# Patient Record
Sex: FEMALE | Race: WHITE | NOT HISPANIC OR LATINO | Employment: OTHER | URBAN - METROPOLITAN AREA
[De-identification: names, ages, dates, MRNs, and addresses within clinical notes are randomized per-mention and may not be internally consistent; named-entity substitution may affect disease eponyms.]

---

## 2023-10-09 ENCOUNTER — TELEPHONE (OUTPATIENT)
Dept: NEUROLOGY | Facility: CLINIC | Age: 88
End: 2023-10-09
Payer: MEDICARE

## 2023-10-09 NOTE — TELEPHONE ENCOUNTER
Called and wanted a call from Dr Sinha before her 10/26 appointment.  States that the Botox is not working at all.

## 2023-10-19 ENCOUNTER — TELEPHONE (OUTPATIENT)
Dept: NEUROLOGY | Facility: CLINIC | Age: 88
End: 2023-10-19
Payer: MEDICARE

## 2023-10-19 NOTE — TELEPHONE ENCOUNTER
Irwin called 10/9 to let us know that the Botox is not working.  She called again today wondering if the dose will be increased or stopped.  Please advise me as to what I can tell her to expect at her next appointment.

## 2023-10-26 RX ORDER — CRANBERRY FRUIT 450 MG
450 TABLET ORAL
COMMUNITY
Start: 2019-01-23

## 2023-10-26 RX ORDER — CYPROHEPTADINE HYDROCHLORIDE 4 MG/1
1 TABLET ORAL NIGHTLY
COMMUNITY
Start: 2019-01-23 | End: 2024-05-13 | Stop reason: ALTCHOICE

## 2023-10-26 RX ORDER — ACETAMINOPHEN 500 MG
1 TABLET ORAL
COMMUNITY
Start: 2019-01-23

## 2023-10-26 RX ORDER — PANTOPRAZOLE SODIUM 40 MG/1
1 TABLET, DELAYED RELEASE ORAL DAILY
COMMUNITY
Start: 2019-01-23

## 2023-10-26 RX ORDER — PROGESTERONE 200 MG/1
200 CAPSULE ORAL
COMMUNITY
Start: 2019-01-23

## 2023-10-26 RX ORDER — FLUOXETINE HYDROCHLORIDE 40 MG/1
40 CAPSULE ORAL
COMMUNITY
Start: 2023-10-16

## 2023-10-26 RX ORDER — PROPRANOLOL/HYDROCHLOROTHIAZID 40 MG-25MG
1 TABLET ORAL DAILY
COMMUNITY
Start: 2019-01-23 | End: 2024-03-13 | Stop reason: ALTCHOICE

## 2023-10-26 RX ORDER — DICYCLOMINE HYDROCHLORIDE 10 MG/1
1 CAPSULE ORAL 3 TIMES DAILY
COMMUNITY
Start: 2019-01-23 | End: 2024-03-13 | Stop reason: ALTCHOICE

## 2023-10-26 RX ORDER — FLUTICASONE PROPIONATE 50 MCG
2 SPRAY, SUSPENSION (ML) NASAL DAILY
COMMUNITY
Start: 2019-01-23 | End: 2024-03-13 | Stop reason: ALTCHOICE

## 2023-10-26 RX ORDER — LINACLOTIDE 72 UG/1
1 CAPSULE, GELATIN COATED ORAL DAILY
COMMUNITY
Start: 2019-01-23 | End: 2024-05-13 | Stop reason: ALTCHOICE

## 2023-10-26 RX ORDER — IBUPROFEN 600 MG/1
1 TABLET ORAL 3 TIMES DAILY PRN
COMMUNITY
Start: 2019-01-23 | End: 2024-05-13 | Stop reason: ALTCHOICE

## 2023-10-26 RX ORDER — DULOXETIN HYDROCHLORIDE 20 MG/1
1 CAPSULE, DELAYED RELEASE ORAL DAILY
COMMUNITY
Start: 2019-01-23 | End: 2024-03-13 | Stop reason: ALTCHOICE

## 2023-10-26 RX ORDER — SIMVASTATIN 20 MG/1
1 TABLET, FILM COATED ORAL DAILY
COMMUNITY
Start: 2019-01-23

## 2023-10-26 RX ORDER — AMLODIPINE BESYLATE 5 MG/1
1 TABLET ORAL DAILY
COMMUNITY
Start: 2019-01-23

## 2023-10-26 RX ORDER — NITROGLYCERIN 0.4 MG/1
0.4 TABLET SUBLINGUAL EVERY 5 MIN PRN
COMMUNITY
Start: 2019-01-23

## 2023-10-26 RX ORDER — FLUOROURACIL 5 MG/G
1 CREAM TOPICAL SEE ADMIN INSTRUCTIONS
COMMUNITY
Start: 2019-01-23

## 2023-10-26 RX ORDER — ASPIRIN 81 MG/1
1 TABLET ORAL DAILY
COMMUNITY
Start: 2019-01-23

## 2023-10-27 ENCOUNTER — PROCEDURE VISIT (OUTPATIENT)
Dept: NEUROLOGY | Facility: HOSPITAL | Age: 88
End: 2023-10-27
Payer: MEDICARE

## 2023-10-27 VITALS
WEIGHT: 175 LBS | TEMPERATURE: 97.1 F | HEIGHT: 61 IN | BODY MASS INDEX: 33.04 KG/M2 | RESPIRATION RATE: 18 BRPM | SYSTOLIC BLOOD PRESSURE: 149 MMHG | DIASTOLIC BLOOD PRESSURE: 76 MMHG | HEART RATE: 73 BPM

## 2023-10-27 DIAGNOSIS — G43.711 CHRONIC MIGRAINE WITHOUT AURA, INTRACTABLE, WITH STATUS MIGRAINOSUS: Primary | ICD-10-CM

## 2023-10-27 PROCEDURE — 64615 CHEMODENERV MUSC MIGRAINE: CPT | Performed by: PSYCHIATRY & NEUROLOGY

## 2023-10-27 PROCEDURE — 2500000004 HC RX 250 GENERAL PHARMACY W/ HCPCS (ALT 636 FOR OP/ED): Mod: JZ,JG | Performed by: PSYCHIATRY & NEUROLOGY

## 2023-10-27 RX ADMIN — ONABOTULINUMTOXINA 200 UNITS: 100 INJECTION, POWDER, LYOPHILIZED, FOR SOLUTION INTRADERMAL; INTRAMUSCULAR at 14:02

## 2023-10-27 ASSESSMENT — ENCOUNTER SYMPTOMS
OCCASIONAL FEELINGS OF UNSTEADINESS: 1
DEPRESSION: 0
LOSS OF SENSATION IN FEET: 0

## 2023-10-27 ASSESSMENT — PAIN SCALES - GENERAL: PAINLEVEL: 0-NO PAIN

## 2023-10-27 NOTE — PROGRESS NOTES
Patient ID: Irwin Napoles is a 88 y.o. female.    Head/Face/Jaw Botulinum Injection    Date/Time: 10/27/2023 3:05 PM    Performed by: Lashell Sinha MD  Authorized by: Lashell Sinha MD      Consent:      Consent obtained:  Verbal     Consent given by:  Patient    Procedure details:      EMG used?  No     Electrical stimulation used?  No     Diluted by:  Preservative free saline     Toxin (Brand):  OnaBoNT-A (Botox)     Total units available:  200       Ad hoc region injected:  Head see diagram with 200 units     Total units injected:  200     Total units wasted:  0    Post-procedure details:      Patient tolerance of procedure:  Tolerated well, no immediate complications    Comments: Please do not rub areas for 24 hours.  No pressure above eyebrows for 24 hours.  Watch out for helmets, headlamps, headbands, goggles, or massage for 24 hours.  If there is discomfort, ice for the first 24 hour,s heat after that.  Headaches may worsen, or you may experience neck stiffness.  If this occurs use your usual headache medication or a mild anti inflammatory such as advil or aleve.  Please call if you have difficulty swallowing.      Don't get discouraged by the lack of effect from the first botox we know it is only 75% as effective as the second.   We will do an appointment in 6 weeks. And repeat in 3 months. Patient is here for botox injection.

## 2024-01-22 ENCOUNTER — TELEPHONE (OUTPATIENT)
Dept: NEUROLOGY | Facility: CLINIC | Age: 89
End: 2024-01-22
Payer: MEDICARE

## 2024-01-26 ENCOUNTER — APPOINTMENT (OUTPATIENT)
Dept: NEUROLOGY | Facility: HOSPITAL | Age: 89
End: 2024-01-26

## 2024-02-02 ENCOUNTER — TELEPHONE (OUTPATIENT)
Dept: NEUROLOGY | Facility: CLINIC | Age: 89
End: 2024-02-02
Payer: MEDICARE

## 2024-02-02 DIAGNOSIS — G43.711 CHRONIC MIGRAINE WITHOUT AURA, INTRACTABLE, WITH STATUS MIGRAINOSUS: ICD-10-CM

## 2024-02-02 RX ORDER — BUTALBITAL, ASPIRIN, AND CAFFEINE 325; 50; 40 MG/1; MG/1; MG/1
CAPSULE ORAL
Qty: 30 CAPSULE | Refills: 0 | Status: SHIPPED | OUTPATIENT
Start: 2024-02-02 | End: 2024-04-01 | Stop reason: SDUPTHER

## 2024-02-02 RX ORDER — BUTALBITAL, ASPIRIN, AND CAFFEINE 325; 50; 40 MG/1; MG/1; MG/1
CAPSULE ORAL
COMMUNITY
End: 2024-02-02 | Stop reason: SDUPTHER

## 2024-02-02 NOTE — TELEPHONE ENCOUNTER
Last appointment 8/31/23  No future appointment schedule- left message to schedule another one    Sent to MD

## 2024-02-02 NOTE — TELEPHONE ENCOUNTER
Called to refill Butalbatal 50/300/40.  Pharmacy is [facility] - [place], [place] - [facility] [address] [[id number]]

## 2024-03-13 ENCOUNTER — PROCEDURE VISIT (OUTPATIENT)
Dept: NEUROLOGY | Facility: CLINIC | Age: 89
End: 2024-03-13
Payer: MEDICARE

## 2024-03-13 VITALS
BODY MASS INDEX: 33.12 KG/M2 | TEMPERATURE: 97.6 F | WEIGHT: 175.3 LBS | SYSTOLIC BLOOD PRESSURE: 128 MMHG | DIASTOLIC BLOOD PRESSURE: 72 MMHG

## 2024-03-13 DIAGNOSIS — G43.711 INTRACTABLE CHRONIC MIGRAINE WITHOUT AURA AND WITH STATUS MIGRAINOSUS: Primary | ICD-10-CM

## 2024-03-13 PROCEDURE — 64615 CHEMODENERV MUSC MIGRAINE: CPT | Performed by: PSYCHIATRY & NEUROLOGY

## 2024-03-13 NOTE — PROGRESS NOTES
Patient ID: Irwin Napoles is a 88 y.o. female.    Head/Face/Jaw Botulinum Injection    Date/Time: 3/13/2024 4:16 PM    Performed by: Lashell Sinha MD  Authorized by: Lashell Sinha MD      Consent:      Consent obtained:  Verbal     Consent given by:  Patient    Procedure details:      EMG used?  No     Electrical stimulation used?  No     Diluted by:  Preservative free saline     Toxin (Brand):  OnaBoNT-A (Botox)     Total units available:  200       Ad hoc region injected:  Head see diagram with 200 units     Total units injected:  200     Total units wasted:  0    Post-procedure details:      Patient tolerance of procedure:  Tolerated well, no immediate complications    Comments: Please do not rub areas for 24 hours.  No pressure above eyebrows for 24 hours.  Watch out for helmets, headlamps, headbands, goggles, or massage for 24 hours.  If there is discomfort, ice for the first 24 hour,s heat after that.  Headaches may worsen, or you may experience neck stiffness.  If this occurs use your usual headache medication or a mild anti inflammatory such as advil or aleve.  Please call if you have difficulty swallowing.

## 2024-03-22 ENCOUNTER — APPOINTMENT (OUTPATIENT)
Dept: NEUROLOGY | Facility: HOSPITAL | Age: 89
End: 2024-03-22
Payer: MEDICARE

## 2024-04-01 ENCOUNTER — TELEPHONE (OUTPATIENT)
Dept: NEUROLOGY | Facility: CLINIC | Age: 89
End: 2024-04-01
Payer: MEDICARE

## 2024-04-01 DIAGNOSIS — G43.711 CHRONIC MIGRAINE WITHOUT AURA, INTRACTABLE, WITH STATUS MIGRAINOSUS: ICD-10-CM

## 2024-04-01 RX ORDER — MELATONIN 3 MG
CAPSULE ORAL
COMMUNITY

## 2024-04-01 RX ORDER — KETOCONAZOLE 20 MG/ML
SHAMPOO, SUSPENSION TOPICAL
COMMUNITY
Start: 2024-03-04

## 2024-04-01 RX ORDER — LORATADINE 10 MG/1
TABLET ORAL
COMMUNITY
End: 2024-05-13 | Stop reason: ALTCHOICE

## 2024-04-01 RX ORDER — CLOBETASOL PROPIONATE 0.46 MG/ML
SOLUTION TOPICAL
COMMUNITY
Start: 2024-03-04

## 2024-04-01 RX ORDER — AMLODIPINE BESYLATE 2.5 MG/1
TABLET ORAL
COMMUNITY
Start: 2024-02-29

## 2024-04-01 NOTE — TELEPHONE ENCOUNTER
"Irwin called and stated that she last received Botox on 3/13/24 and she is still getting some pretty bad headaches and states \"it does not seem to be helping\"     Also stated she need a refill on her Fioricet as she has been taking it more often to help with her headaches- will send to Dr. Sinha   "

## 2024-04-03 RX ORDER — BUTALBITAL, ASPIRIN, AND CAFFEINE 325; 50; 40 MG/1; MG/1; MG/1
CAPSULE ORAL
Qty: 30 CAPSULE | Refills: 0 | Status: SHIPPED | OUTPATIENT
Start: 2024-04-03 | End: 2024-05-13 | Stop reason: SDUPTHER

## 2024-05-13 ENCOUNTER — OFFICE VISIT (OUTPATIENT)
Dept: NEUROLOGY | Facility: CLINIC | Age: 89
End: 2024-05-13
Payer: MEDICARE

## 2024-05-13 VITALS
RESPIRATION RATE: 20 BRPM | HEART RATE: 84 BPM | DIASTOLIC BLOOD PRESSURE: 68 MMHG | TEMPERATURE: 98.2 F | SYSTOLIC BLOOD PRESSURE: 118 MMHG

## 2024-05-13 DIAGNOSIS — Z79.899 LONG-TERM USE OF HIGH-RISK MEDICATION: ICD-10-CM

## 2024-05-13 DIAGNOSIS — G43.711 CHRONIC MIGRAINE WITHOUT AURA, INTRACTABLE, WITH STATUS MIGRAINOSUS: ICD-10-CM

## 2024-05-13 PROCEDURE — 99215 OFFICE O/P EST HI 40 MIN: CPT | Performed by: PSYCHIATRY & NEUROLOGY

## 2024-05-13 PROCEDURE — 1159F MED LIST DOCD IN RCRD: CPT | Performed by: PSYCHIATRY & NEUROLOGY

## 2024-05-13 RX ORDER — BUTALBITAL, ASPIRIN, AND CAFFEINE 325; 50; 40 MG/1; MG/1; MG/1
CAPSULE ORAL
Qty: 45 CAPSULE | Refills: 3 | Status: SHIPPED | OUTPATIENT
Start: 2024-05-13

## 2024-05-13 NOTE — PROGRESS NOTES
"Patient with history significant for chronic migraine without aura, esophageal reflux, heart disease, hyperlipidemia, hypertension, irritable bowel syndrome, and neck pain here to discuss headaches.     Does not feel last 2 Botox treatments worked for her migraine.   She does not want to continue taking it.   \"It is getting expensive and not working\"  It had been working initially but does not seem to be now.   She experienced this same thing at [facility]. Worked for a while with a Dr. Hermosillo and then stopped working.   Could go for weeks without having them.     Nearly daily headache.     Does not wake with a headache in the morning.   Eats breakfast and while watching TV, headache will occur each day.   Has found some migraine meditations online that are helpful but then falls asleep and is not productive rest of day.     Usually ends up taking Butalbital which also causes sleepiness. Sometimes it takes it away and can continue to function.   Filled 30 capsules 4-3-2024 and has 12 remaining.     Nothing else she has ever taken has been effective.   Feels very debilitated with headaches.     Headache occurs usually right side and above ear and radiated to occipital.   Describes pain as pounding pain.   Last week had extreme sharp right temple pain and took butalbital and took 3 hours to resolve.  Associated light and noise sensitivity, anorexia  Triggers are chocolate     Tried meds.   Emgality not helpful ( no Ajovy or Aimovig or Qulipta)  Ubrelvy didn't help.   Neurontin  Inderal  Amlodipine  Fioricet helpful for rescue for migraine. Sometimes with sedation.  Fluoxetine     Past Meds:  Cyproheptadine  Duloxetine  Methylprednisolone  Excedrin       Has tried acupuncture for back and headaches.  Massage was helpful for tight shoulders and tight neck  Plans to start massage again. Stopped during COVID    OARRS:  No data recorded  I have personally reviewed the OARRS report for Irwin Napoles. I have considered the risks of " abuse, dependence, addiction and diversion    Is the patient prescribed a combination of a benzodiazepine and opioid?  No    Last Urine Drug Screen / ordered today: Yes  No results found for this or any previous visit (from the past 8760 hour(s)).  N/A    Clinical rationale for not completing a Urine Drug Screen: ordered but not obtained as yet.       Controlled Substance Agreement:  Date of the Last Agreement: 5/13/2024  Reviewed Controlled Substance Agreement including but not limited to the benefits, risks, and alternatives to treatment with a Controlled Substance medication(s).    Barbiturates:   What is the patient's goal of therapy? Head pain control  Is this being achieved with current treatment? yes    Activities of Daily Living:   Is your overall impression that this patient is benefiting (symptom reduction outweighs side effects) from barbiturate therapy? Yes     1. Physical Functioning: Same  2. Family Relationship: Same  3. Social Relationship: Same  4. Mood: Same  5. Sleep Patterns: Same  6. Overall Function: Same    Assess/plan  Increase butalbital to daily and another as needed.

## 2024-05-30 ENCOUNTER — TELEPHONE (OUTPATIENT)
Dept: NEUROLOGY | Facility: CLINIC | Age: 89
End: 2024-05-30
Payer: MEDICARE

## 2024-05-30 DIAGNOSIS — F41.9 ANXIETY: Primary | ICD-10-CM

## 2024-05-30 RX ORDER — ALPRAZOLAM 0.5 MG/1
0.5 TABLET ORAL SEE ADMIN INSTRUCTIONS
Qty: 5 TABLET | Refills: 0 | Status: SHIPPED | OUTPATIENT
Start: 2024-05-30 | End: 2024-06-06

## 2024-05-31 ENCOUNTER — APPOINTMENT (OUTPATIENT)
Dept: RADIOLOGY | Facility: CLINIC | Age: 89
End: 2024-05-31
Payer: MEDICARE

## 2024-06-05 ENCOUNTER — HOSPITAL ENCOUNTER (OUTPATIENT)
Dept: RADIOLOGY | Facility: CLINIC | Age: 89
Discharge: HOME | End: 2024-06-05
Payer: MEDICARE

## 2024-06-05 DIAGNOSIS — Z79.899 LONG-TERM USE OF HIGH-RISK MEDICATION: ICD-10-CM

## 2024-06-05 PROCEDURE — 70553 MRI BRAIN STEM W/O & W/DYE: CPT

## 2024-06-05 PROCEDURE — 2550000001 HC RX 255 CONTRASTS: Performed by: PSYCHIATRY & NEUROLOGY

## 2024-06-05 PROCEDURE — 70553 MRI BRAIN STEM W/O & W/DYE: CPT | Performed by: STUDENT IN AN ORGANIZED HEALTH CARE EDUCATION/TRAINING PROGRAM

## 2024-06-05 PROCEDURE — A9575 INJ GADOTERATE MEGLUMI 0.1ML: HCPCS | Performed by: PSYCHIATRY & NEUROLOGY

## 2024-06-05 RX ORDER — GADOTERATE MEGLUMINE 376.9 MG/ML
20 INJECTION INTRAVENOUS
Status: COMPLETED | OUTPATIENT
Start: 2024-06-05 | End: 2024-06-05

## 2024-06-05 RX ADMIN — GADOTERATE MEGLUMINE 16 ML: 376.9 INJECTION INTRAVENOUS at 13:39

## 2024-06-07 NOTE — RESULT ENCOUNTER NOTE
Please call the patient regarding her abnormal result. 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 We should make an appointment with family and decide if we want to treat this with a shunt or treat the symptoms (headache, gait difficulties, possible memory issues) medically if they come

## 2024-06-10 ENCOUNTER — TELEPHONE (OUTPATIENT)
Dept: NEUROLOGY | Facility: CLINIC | Age: 89
End: 2024-06-10
Payer: MEDICARE

## 2024-06-21 ENCOUNTER — APPOINTMENT (OUTPATIENT)
Dept: NEUROLOGY | Facility: HOSPITAL | Age: 89
End: 2024-06-21
Payer: MEDICARE

## 2024-08-01 ENCOUNTER — APPOINTMENT (OUTPATIENT)
Dept: NEUROLOGY | Facility: CLINIC | Age: 89
End: 2024-08-01
Payer: MEDICARE

## 2024-08-16 ENCOUNTER — OFFICE VISIT (OUTPATIENT)
Dept: NEUROLOGY | Facility: HOSPITAL | Age: 89
End: 2024-08-16
Payer: MEDICARE

## 2024-08-16 VITALS
HEIGHT: 60 IN | DIASTOLIC BLOOD PRESSURE: 77 MMHG | BODY MASS INDEX: 33.38 KG/M2 | RESPIRATION RATE: 18 BRPM | SYSTOLIC BLOOD PRESSURE: 152 MMHG | WEIGHT: 170 LBS | HEART RATE: 75 BPM | TEMPERATURE: 97.5 F

## 2024-08-16 DIAGNOSIS — G43.711 CHRONIC MIGRAINE WITHOUT AURA, INTRACTABLE, WITH STATUS MIGRAINOSUS: Primary | ICD-10-CM

## 2024-08-16 DIAGNOSIS — G91.2 NPH (NORMAL PRESSURE HYDROCEPHALUS) (MULTI): ICD-10-CM

## 2024-08-16 PROCEDURE — 99213 OFFICE O/P EST LOW 20 MIN: CPT | Performed by: PSYCHIATRY & NEUROLOGY

## 2024-08-16 PROCEDURE — 1125F AMNT PAIN NOTED PAIN PRSNT: CPT | Performed by: PSYCHIATRY & NEUROLOGY

## 2024-08-16 PROCEDURE — 1036F TOBACCO NON-USER: CPT | Performed by: PSYCHIATRY & NEUROLOGY

## 2024-08-16 RX ORDER — AMITRIPTYLINE HYDROCHLORIDE 10 MG/1
10 TABLET, FILM COATED ORAL NIGHTLY
Qty: 30 TABLET | Refills: 11 | Status: SHIPPED | OUTPATIENT
Start: 2024-08-16 | End: 2025-08-16

## 2024-08-16 ASSESSMENT — PAIN SCALES - GENERAL: PAINLEVEL: 5

## 2024-08-16 NOTE — PROGRESS NOTES
"Pt history significant for chronic migraine without aura, fibromyalgia, esophageal reflux, heart disease, hyperlipidemia, hypertension, irritable bowel syndrome, and neck pain here to discuss headaches. Had tried Botox treatments but this stopped working so she stopped.    MRI brain w/wo done on 6/2024:  1. No suspicious mass, abnormal parenchymal or leptomeningeal  enhancement.  2. Ventriculomegaly, out of proportion to degree of generalized  volume loss, is nonspecific, but can be seen in the setting of normal  pressure hydrocephalus.  3. Moderate chronic microvascular ischemic changes.    Previous MRI brain in 2/2013 with ventriculomegaly. Also had MRI brain in 6/2020 with stable ventriculomegaly.    Having daily headaches.  Been taking Fiorinol (Butalbital / Aspirin / Caffeine) twice a day for headache pain. She says this helps some, but it puts her to sleep. Heat on the back of her head also helps. Sometimes drinks extra cup of coffee in afternoon and this sometimes helps headache.  She has noticed chocolate triggers her headaches.    Tried meds.   Emgality not helpful ( no Ajovy or Aimovig or Qulipta)  Ubrelvy didn't help.   Neurontin  Inderal  Amlodipine  Fioricet helpful for rescue for migraine. Sometimes with sedation.  Fluoxetine     Past Meds:  Cyproheptadine  Duloxetine  Methylprednisolone  Excedrin    Reports she uses a cane for a couple years (per daughter, but patient says maybe 1 year) because of imbalance. Does report urinary incontinence, which only occurs in morning because she has trouble getting to the bathroom.    Currently battling Vertigo that started 4 weeks ago.   Is slowly improving.   Attending Vestibular therapy every 2 weeks, which is helping. Next is Monday.   Symptoms are intermittent. Worst when changing positions from lying down.   Resolves within 30 min after getting up.   Feels \"cloudy\" in her head.   When first started with 24/7 Dysequilibrium, needed to hold onto the wall to " walk.   Had vertigo before, about 30 years ago    Shoulder have been tight. Wants to restart massages that used to be helpful. Has not had massages since before the pandemic.    PLAN:  - start amitriptyline at bedtime for headache prevention  - Fiorinol PRN  - follow up in 6mo

## 2024-09-03 ENCOUNTER — TELEPHONE (OUTPATIENT)
Dept: NEUROLOGY | Facility: CLINIC | Age: 89
End: 2024-09-03
Payer: MEDICARE

## 2024-09-03 NOTE — TELEPHONE ENCOUNTER
Irwin has been on 10mg amitriptyline for 2 weeks. It has not been helpful for her nearly daily headache.   She has had vertigo for 3-4 weeks as well. Unsure if worse with amitriptyline.   Does help her fall asleep.   No other side effect noted.   Should she continue taking or stop?    She states her brother received prednisone for a bout of vertigo and it cleared in 3 days.   She wonders if this is an appropriate treatment for her vertigo.

## 2024-09-06 ENCOUNTER — TELEPHONE (OUTPATIENT)
Dept: NEUROLOGY | Facility: HOSPITAL | Age: 89
End: 2024-09-06
Payer: MEDICARE

## 2024-09-06 NOTE — TELEPHONE ENCOUNTER
Spoke with Irwin. Phones were out yesterday when call attempted. See other telephone note this date.

## 2024-09-06 NOTE — TELEPHONE ENCOUNTER
----- Message from Geena Wilson sent at 9/6/2024  2:57 PM EDT -----  Left message that she was waiting for callback from you

## 2024-09-06 NOTE — TELEPHONE ENCOUNTER
Reached Irwin, phones had been disconnect yesterday as they were working on her cable.   Instr on increased  Amitriptyline to 20mg and call in 2 weeks. States understanding.

## 2024-09-13 DIAGNOSIS — G43.711 CHRONIC MIGRAINE WITHOUT AURA, INTRACTABLE, WITH STATUS MIGRAINOSUS: ICD-10-CM

## 2024-09-13 RX ORDER — BUTALBITAL, ASPIRIN, AND CAFFEINE 325; 50; 40 MG/1; MG/1; MG/1
CAPSULE ORAL
Qty: 45 CAPSULE | Refills: 3 | Status: SHIPPED | OUTPATIENT
Start: 2024-09-13

## 2024-09-13 NOTE — TELEPHONE ENCOUNTER
Irwin called and stated she only has maybe 5 pills left of this  medication and is wanting a refill. Last filled on 8/14/24.

## 2024-09-16 ENCOUNTER — TELEPHONE (OUTPATIENT)
Dept: NEUROLOGY | Facility: CLINIC | Age: 89
End: 2024-09-16
Payer: MEDICARE

## 2024-09-16 NOTE — TELEPHONE ENCOUNTER
Increased amitriptyline to 20 mg. Feels she is experiencing more dizziness. Headaches unchanged and tolerable. Did not really need help with sleep and sleep is fine.   Stopped amitriptyline 2 days ago and thinks dizziness may be a bit better. Would like to trial off for several days .  Instr to note dizziness symptoms as well as headache intensity while off amitriptyline and journal . She will call me back in 3 days to report.   Currently  headache daily, usually after breakfast. Treats with Butalbital-caffeine- acetaminophen and treats easily.   Some days needs to repeat with headache return by evening.

## 2024-09-19 DIAGNOSIS — G43.711 CHRONIC MIGRAINE WITHOUT AURA, INTRACTABLE, WITH STATUS MIGRAINOSUS: ICD-10-CM

## 2024-09-19 RX ORDER — BUTALBITAL, ASPIRIN, AND CAFFEINE 325; 50; 40 MG/1; MG/1; MG/1
CAPSULE ORAL
Qty: 45 CAPSULE | Refills: 3 | Status: CANCELLED | OUTPATIENT
Start: 2024-09-19

## 2024-09-26 ENCOUNTER — TELEPHONE (OUTPATIENT)
Dept: NEUROLOGY | Facility: CLINIC | Age: 89
End: 2024-09-26
Payer: MEDICARE

## 2024-09-26 NOTE — TELEPHONE ENCOUNTER
Irwin called to check with Pharmacy for a delivery date for butalbital-aspirin-caffeine (Fiorinal) -40 mg capsule. The pharmacy stated a PA was needed and I see that was approved.  The pharmacy states they did not receive anything

## 2024-11-22 ENCOUNTER — OFFICE VISIT (OUTPATIENT)
Dept: NEUROLOGY | Facility: HOSPITAL | Age: 89
End: 2024-11-22
Payer: MEDICARE

## 2024-11-22 VITALS
WEIGHT: 170 LBS | TEMPERATURE: 97.4 F | HEART RATE: 79 BPM | HEIGHT: 60 IN | BODY MASS INDEX: 33.38 KG/M2 | RESPIRATION RATE: 18 BRPM | DIASTOLIC BLOOD PRESSURE: 57 MMHG | SYSTOLIC BLOOD PRESSURE: 107 MMHG

## 2024-11-22 DIAGNOSIS — G43.711 CHRONIC MIGRAINE WITHOUT AURA, INTRACTABLE, WITH STATUS MIGRAINOSUS: ICD-10-CM

## 2024-11-22 DIAGNOSIS — Z79.899 LONG-TERM USE OF HIGH-RISK MEDICATION: ICD-10-CM

## 2024-11-22 PROCEDURE — 99215 OFFICE O/P EST HI 40 MIN: CPT | Performed by: PSYCHIATRY & NEUROLOGY

## 2024-11-22 PROCEDURE — 1126F AMNT PAIN NOTED NONE PRSNT: CPT | Performed by: PSYCHIATRY & NEUROLOGY

## 2024-11-22 PROCEDURE — 1159F MED LIST DOCD IN RCRD: CPT | Performed by: PSYCHIATRY & NEUROLOGY

## 2024-11-22 PROCEDURE — 99417 PROLNG OP E/M EACH 15 MIN: CPT | Performed by: PSYCHIATRY & NEUROLOGY

## 2024-11-22 RX ORDER — BUTALBITAL, ACETAMINOPHEN AND CAFFEINE 50; 325; 40 MG/1; MG/1; MG/1
1 TABLET ORAL EVERY 4 HOURS PRN
Qty: 60 TABLET | Refills: 3 | Status: SHIPPED | OUTPATIENT
Start: 2024-11-22 | End: 2025-11-22

## 2024-11-22 RX ORDER — TRIAMCINOLONE ACETONIDE 1 MG/G
CREAM TOPICAL
COMMUNITY
Start: 2022-10-14

## 2024-11-22 RX ORDER — OLMESARTAN MEDOXOMIL 20 MG/1
20 TABLET ORAL
COMMUNITY
Start: 2024-10-10 | End: 2025-10-10

## 2024-11-22 ASSESSMENT — ENCOUNTER SYMPTOMS
DEPRESSION: 0
OCCASIONAL FEELINGS OF UNSTEADINESS: 1
LOSS OF SENSATION IN FEET: 0

## 2024-11-22 ASSESSMENT — PATIENT HEALTH QUESTIONNAIRE - PHQ9
2. FEELING DOWN, DEPRESSED OR HOPELESS: NOT AT ALL
SUM OF ALL RESPONSES TO PHQ9 QUESTIONS 1 AND 2: 0
1. LITTLE INTEREST OR PLEASURE IN DOING THINGS: NOT AT ALL

## 2024-11-22 ASSESSMENT — PAIN SCALES - GENERAL: PAINLEVEL_OUTOF10: 0-NO PAIN

## 2024-11-22 NOTE — ASSESSMENT & PLAN NOTE
Restart amitriptyline at 10 mg at night call after 2 weeks to report efficacy  Butalbital would like to have 2 a day  Nurtec trial

## 2024-11-22 NOTE — PROGRESS NOTES
UDS re ordered today. Instr pt on need for yearly urine test to prescribe  Butalbital.   CSA completed today.     Patient with history significant for chronic migraine without aura, esophageal reflux, heart disease, hyperlipidemia, hypertension, irritable bowel syndrome,  back and neck pain here to discuss headaches.      Headache 5-6 days per week at 10-11 am, just after breakfast.   Taking Butalbital- caffeine- aspirin nearly daily.Takes one and if not improving, takes another in 1 hours.   Nausea photo and phono phobia    Would like more than 45 per 90 days and is running out.     Tries Excedrin migraine if doesn't have Butalbital. Sometimes it helps.     Associated light and noise sensitivity. Anorexia  Triggers-Smoked meats _lox and herring,Cold cuts, overindulging in chocolate    Uses hot water behind neck. relaxing  Headache is bilateral sides of head.     Is taking no amitriptylline    Endorses intermittently double vision when doesn't have her glasses on. Saw optometrist who added something to her prescription so no longer sees double with her glasses. When takes glasses off , sees double.   Wondering if it can contribute to    Seeing pain management for back pain 10-  L5/S1 epidural steroid injection . States is feeling better    Takes a hemp gummy at bed for sleep. Melatonin combo    Sleeps 8 hours. Wakes once to use bathroom.       Meds tried  Ubrelvy not helpful  Emgality not helpful  Botox  Neurontin  Tramadol for back-not helpful  Amitriptyline- ? Side effect  Oxycodone- not helpful  Physical Therapy     OARRS:  No data recorded  I have personally reviewed the OARRS report for Irwin Napoles. I have considered the risks of abuse, dependence, addiction and diversion    Is the patient prescribed a combination of a benzodiazepine and opioid?  No    Last Urine Drug Screen / ordered today: Yes  No results found for this or any previous visit (from the past 8760 hours).  N/A        Controlled Substance  Agreement:  Date of the Last Agreement: 05/13/2024  Reviewed Controlled Substance Agreement including but not limited to the benefits, risks, and alternatives to treatment with a Controlled Substance medication(s).    Barbiturates:   What is the patient's goal of therapy? Head pain control  Is this being achieved with current treatment? yes    Activities of Daily Living:   Is your overall impression that this patient is benefiting (symptom reduction outweighs side effects) from barbiturate therapy? Yes     1. Physical Functioning: Worse  2. Family Relationship: same  3. Social Relationship: same  4. Mood: Same  5. Sleep Patterns: Same  6. Overall Function: Worse

## 2024-11-25 DIAGNOSIS — G43.711 CHRONIC MIGRAINE WITHOUT AURA, INTRACTABLE, WITH STATUS MIGRAINOSUS: ICD-10-CM

## 2024-11-25 RX ORDER — AMITRIPTYLINE HYDROCHLORIDE 10 MG/1
10 TABLET, FILM COATED ORAL NIGHTLY
Qty: 90 TABLET | Refills: 3 | Status: SHIPPED | OUTPATIENT
Start: 2024-11-25

## 2024-11-25 NOTE — TELEPHONE ENCOUNTER
Cannot find original amitriptyline script. Will resent to [facility] pharmacy per her request. Should be good for fill as filled 8-

## 2024-12-10 ENCOUNTER — LAB (OUTPATIENT)
Dept: LAB | Facility: LAB | Age: 89
End: 2024-12-10
Payer: MEDICARE

## 2024-12-10 DIAGNOSIS — Z79.899 LONG-TERM USE OF HIGH-RISK MEDICATION: ICD-10-CM

## 2024-12-10 PROCEDURE — 80358 DRUG SCREENING METHADONE: CPT

## 2024-12-10 PROCEDURE — 80346 BENZODIAZEPINES1-12: CPT

## 2024-12-10 PROCEDURE — 80345 DRUG SCREENING BARBITURATES: CPT

## 2024-12-10 PROCEDURE — 80365 DRUG SCREENING OXYCODONE: CPT

## 2024-12-10 PROCEDURE — 80373 DRUG SCREENING TRAMADOL: CPT

## 2024-12-10 PROCEDURE — 80368 SEDATIVE HYPNOTICS: CPT

## 2024-12-10 PROCEDURE — 80354 DRUG SCREENING FENTANYL: CPT

## 2024-12-10 PROCEDURE — 80307 DRUG TEST PRSMV CHEM ANLYZR: CPT

## 2024-12-10 PROCEDURE — 80361 OPIATES 1 OR MORE: CPT

## 2024-12-10 PROCEDURE — 82570 ASSAY OF URINE CREATININE: CPT

## 2024-12-11 LAB
AMPHETAMINES UR QL SCN: ABNORMAL
BARBITURATES UR QL SCN: ABNORMAL
BZE UR QL SCN: ABNORMAL
CANNABINOIDS UR QL SCN: ABNORMAL
CREAT UR-MCNC: 104 MG/DL (ref 20–320)
PCP UR QL SCN: ABNORMAL

## 2024-12-19 LAB
BUTALBITAL, URN, QUANT: 937 NG/ML
PENTOBARBITAL, URN, QUANT: <50 NG/ML
PHENOBARBITAL, URN, QUANT: <50 NG/ML

## 2025-01-17 ENCOUNTER — TELEPHONE (OUTPATIENT)
Dept: NEUROLOGY | Facility: HOSPITAL | Age: 89
End: 2025-01-17
Payer: MEDICARE

## 2025-01-17 DIAGNOSIS — G43.711 CHRONIC MIGRAINE WITHOUT AURA, INTRACTABLE, WITH STATUS MIGRAINOSUS: ICD-10-CM

## 2025-01-17 RX ORDER — ATORVASTATIN CALCIUM 80 MG/1
TABLET, FILM COATED ORAL
COMMUNITY
Start: 2025-01-09

## 2025-01-17 RX ORDER — CLOPIDOGREL BISULFATE 75 MG/1
TABLET ORAL
COMMUNITY
Start: 2025-01-09

## 2025-01-17 RX ORDER — TIZANIDINE 2 MG/1
1 TABLET ORAL EVERY 6 HOURS
COMMUNITY
Start: 2025-01-14

## 2025-01-17 RX ORDER — BUTALBITAL, ACETAMINOPHEN AND CAFFEINE 50; 325; 40 MG/1; MG/1; MG/1
1 TABLET ORAL EVERY 4 HOURS PRN
Qty: 60 TABLET | Refills: 3 | Status: SHIPPED | OUTPATIENT
Start: 2025-01-17 | End: 2026-01-17

## 2025-01-17 RX ORDER — METOPROLOL TARTRATE 25 MG/1
TABLET, FILM COATED ORAL
COMMUNITY
Start: 2025-01-09

## 2025-01-17 NOTE — TELEPHONE ENCOUNTER
Irwin states she is no longer able to take the Butalbital formulation with aspirin as she is now on blood thinner , Plavix, after her cardiac catheterization.   She would like Butalbital- acetaminophen-caffeine sent to local [facility] as [facility] send away cannot her her refills in a timely manner.   Discontinue Butalbital script to [facility]   New script to [facility].

## 2025-05-05 ENCOUNTER — TELEPHONE (OUTPATIENT)
Dept: NEUROLOGY | Facility: CLINIC | Age: 89
End: 2025-05-05
Payer: MEDICARE

## 2025-05-16 ENCOUNTER — APPOINTMENT (OUTPATIENT)
Dept: NEUROLOGY | Facility: HOSPITAL | Age: 89
End: 2025-05-16
Payer: MEDICARE